# Patient Record
Sex: MALE | Race: BLACK OR AFRICAN AMERICAN
[De-identification: names, ages, dates, MRNs, and addresses within clinical notes are randomized per-mention and may not be internally consistent; named-entity substitution may affect disease eponyms.]

---

## 2020-06-15 ENCOUNTER — HOSPITAL ENCOUNTER (EMERGENCY)
Dept: HOSPITAL 61 - ER | Age: 30
Discharge: HOME | End: 2020-06-15
Payer: COMMERCIAL

## 2020-06-15 VITALS — SYSTOLIC BLOOD PRESSURE: 146 MMHG | DIASTOLIC BLOOD PRESSURE: 91 MMHG

## 2020-06-15 VITALS — DIASTOLIC BLOOD PRESSURE: 92 MMHG | SYSTOLIC BLOOD PRESSURE: 146 MMHG

## 2020-06-15 VITALS — BODY MASS INDEX: 18.12 KG/M2 | WEIGHT: 126.55 LBS | HEIGHT: 70 IN
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
## 2020-06-15 DIAGNOSIS — R20.2: ICD-10-CM

## 2020-06-15 DIAGNOSIS — S43.315A: Primary | ICD-10-CM
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
## 2020-06-15 DIAGNOSIS — F17.200: ICD-10-CM

## 2020-06-15 PROCEDURE — 99285 EMERGENCY DEPT VISIT HI MDM: CPT

## 2020-06-15 PROCEDURE — 23650 CLTX SHO DSLC W/MNPJ WO ANES: CPT

## 2020-06-15 PROCEDURE — 73030 X-RAY EXAM OF SHOULDER: CPT

## 2020-06-15 PROCEDURE — 96374 THER/PROPH/DIAG INJ IV PUSH: CPT

## 2020-06-15 NOTE — RAD
EXAM: Left shoulder, 2 views.

 

HISTORY: Pain.

 

COMPARISON: None.

 

FINDINGS: 2 views of left shoulder obtained. There is anterior left 

shoulder dislocation. There is a small radiodense foreign body overlying 

the scapula.

 

IMPRESSION: Anterior left shoulder dislocation. Follow-up following closed

reduction is recommended to assess for possible concomitant fracture.

 

Electronically signed by: Meron Cook MD (6/15/2020 6:15 PM) [facility]

## 2020-06-15 NOTE — RAD
EXAM: Left shoulder, 2 views.

 

HISTORY: Dislocation.

 

COMPARISON: 6/15/2019.

 

FINDINGS: 2 views of the left shoulder obtained. There has been closed 

reduction of the shoulder into anatomic alignment. No concomitant fracture

is seen. There is a prominent acromioclavicular joint space which is 

likely physiologic.

 

IMPRESSION: Closed reduction of the left shoulder into anatomic alignment.

 

Electronically signed by: Meron Cook MD (6/15/2020 6:56 PM) [facility]

## 2020-06-15 NOTE — PHYS DOC
Past Medical History


Additional Past Medical Histor:  Left shoulder dislocation


 (PEÑA HERNANDEZ DO)


Past Surgical History:  No Surgical History


 (PEÑA HERNANDEZ DO)


Smoking Status:  Current Some Day Smoker


Alcohol Use:  Occasionally


Drug Use:  None


 (PEÑA HERNANDEZ DO)





General Adult


EDM:


Chief Complaint:  SHOULDER INJURY





HPI:


HPI:





Patient is a 29 year old male presents with report of left shoulder pain and 

concern he dislocated his shoulder while patient was at work 1640.  Patient 

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  Denies

any numbness or tingling.  Denies fall.  Reports has had this happen in the 

past.  Reports last was seen approximately 5 years ago at [facility] for same.  Denies taking any medication prior to arrival for pain.


 (PEÑA HERNANDEZ DO)





Review of Systems:


Review of Systems:





Constitutional: Denies fever or chills 


Eyes: Denies redness or eye pain 


HENT: Denies nasal congestion or sore throat


Respiratory: Denies cough or shortness of breath 


Cardiovascular: Denies chest pain or palpitations


GI: Denies abdominal pain, nausea, or vomiting


: Denies dysuria or hematuria


Musculoskeletal: Denies back pain; reports left shoulder pain


Integument: Denies rash or skin lesions 


Neurologic: Denies headache, focal weakness or sensory changes





Complete systems were reviewed and found to be within normal limits, except as 

documented in this note.


 (PEÑA HERNANDEZ DO)





Physical Exam:


PE:





Constitutional: Well developed, well nourished, no acute distress, non-toxic 

appearance


HENT: Normocephalic, atraumatic, oropharynx moist


Eyes: PERRL, EOMI, conjunctiva normal, no discharge


Neck: Normal range of motion, no tenderness, supple


Cardiovascular: Heart rate normal, regular rhythm


Lungs & Thorax:  Bilateral breath sounds clear to auscultation, no wheezing


Skin: Warm, dry, no erythema, no rash


Extremities: Left shoulder with dimpling superior to glenohumeral joint, ROM 

limited due to pain, left radial pulse +2, distal sensation intact,  limb held 

in patient's sling from home


Neurologic: Alert and oriented X 3, normal motor function, normal sensory 

function, no focal deficits noted


Psychologic: Affect normal, judgment normal


 (PEÑA HERNANDEZ DO)





EKG:


EKG:


[]


 (PEÑA HERNANDEZ DO)





Radiology/Procedures:


Radiology/Procedures:


[]


 (PEÑA HERNANDEZ DO)





Course & Med Decision Making:


Course & Med Decision Making





Patient presents with HPI and physical exam concerning for left shoulder d

islocation.  Patient neurologically intact.  Limb neurovascularly intact.  Pain 

addressed.  X-ray pending.





Sign out given to Dr. Marks for further evaluation and final disposition.





Discussed current findings and plan with patient, who acknowledges understanding

 and agreement.


 (PEÑA HERNANDEZ DO)


Course & Med Decision Making


Patient care was accepted from Dr. Hernandez at 6:00 PM.  After informed consent 

obtained, and after timeout performed, patient was treated with moderate 

sedation with a total of 50 mg of propofol, after which patient adequately 

anesthetized and gentle traction/countertraction to the left shoulder was 

performed with palpable reduction to normal anatomic alignment.  Patient 

tolerated procedure well without any hypoxic episodes.  Patient observed in 

emergency room until return to complete baseline mental status.


 (MIKE MARKS Jr., DO)


Dragon Disclaimer:


Dragon Disclaimer:


This electronic medical record was generated, in whole or in part, using a voice

 recognition dictation system.


 (PEÑA HERNANDEZ DO)





Departure


Departure


Impression:  


   Primary Impression:  


   Dislocation of left shoulder joint


   Qualified Codes:  S43.005A - Unspecified dislocation of left shoulder joint, 

   initial encounter


Disposition:  01 HOME, SELF-CARE


Condition:  STABLE


Referrals:  


NO PCP (PCP)


Patient Instructions:  Shoulder Dislocation


Scripts


Diclofenac Sodium (DICLOFENAC SODIUM) 50 Mg Tablet.


1 TAB PO BID PRN for PAIN, #20 TAB


   Prov: MIKE MARKS Jr., DO         6/15/20





Justicifation of Admission Dx:


Justifications for Admission:


Justification of Admission Dx:  N/A


 (PEÑA HERNANDEZ DO)











PEÑA HERNANDEZ DO             Juan David 15, 2020 18:01


MIKE MARKS Jr., DO          Juan David 15, 2020 19:07